# Patient Record
Sex: MALE | Race: WHITE | URBAN - METROPOLITAN AREA
[De-identification: names, ages, dates, MRNs, and addresses within clinical notes are randomized per-mention and may not be internally consistent; named-entity substitution may affect disease eponyms.]

---

## 2017-01-01 ENCOUNTER — TELEPHONE (OUTPATIENT)
Dept: INTERVENTIONAL RADIOLOGY/VASCULAR | Facility: CLINIC | Age: 72
End: 2017-01-01

## 2017-01-01 ENCOUNTER — HOSPITAL ENCOUNTER (OUTPATIENT)
Facility: CLINIC | Age: 72
Discharge: HOME | End: 2017-05-08
Attending: RADIOLOGY | Admitting: RADIOLOGY
Payer: COMMERCIAL

## 2017-01-01 ENCOUNTER — TRANSFERRED RECORDS (OUTPATIENT)
Dept: HEALTH INFORMATION MANAGEMENT | Facility: CLINIC | Age: 72
End: 2017-01-01

## 2017-01-01 ENCOUNTER — MEDICAL CORRESPONDENCE (OUTPATIENT)
Dept: HEALTH INFORMATION MANAGEMENT | Facility: CLINIC | Age: 72
End: 2017-01-01

## 2017-01-01 ENCOUNTER — APPOINTMENT (OUTPATIENT)
Dept: MEDSURG UNIT | Facility: CLINIC | Age: 72
End: 2017-01-01
Attending: RADIOLOGY
Payer: COMMERCIAL

## 2017-01-01 ENCOUNTER — APPOINTMENT (OUTPATIENT)
Dept: INTERVENTIONAL RADIOLOGY/VASCULAR | Facility: CLINIC | Age: 72
End: 2017-01-01
Attending: RADIOLOGY
Payer: COMMERCIAL

## 2017-01-01 ENCOUNTER — APPOINTMENT (OUTPATIENT)
Dept: NUCLEAR MEDICINE | Facility: CLINIC | Age: 72
End: 2017-01-01
Attending: RADIOLOGY
Payer: COMMERCIAL

## 2017-01-01 ENCOUNTER — OFFICE VISIT (OUTPATIENT)
Dept: RADIOLOGY | Facility: CLINIC | Age: 72
End: 2017-01-01
Attending: RADIOLOGY
Payer: COMMERCIAL

## 2017-01-01 ENCOUNTER — HOSPITAL ENCOUNTER (OUTPATIENT)
Dept: NUCLEAR MEDICINE | Facility: CLINIC | Age: 72
Setting detail: NUCLEAR MEDICINE
End: 2017-05-08
Attending: RADIOLOGY | Admitting: RADIOLOGY
Payer: COMMERCIAL

## 2017-01-01 VITALS
BODY MASS INDEX: 24.51 KG/M2 | DIASTOLIC BLOOD PRESSURE: 68 MMHG | TEMPERATURE: 97.5 F | OXYGEN SATURATION: 100 % | SYSTOLIC BLOOD PRESSURE: 119 MMHG | WEIGHT: 184.97 LBS | RESPIRATION RATE: 16 BRPM | HEART RATE: 65 BPM | HEIGHT: 73 IN

## 2017-01-01 VITALS
RESPIRATION RATE: 18 BRPM | BODY MASS INDEX: 24.78 KG/M2 | WEIGHT: 187 LBS | SYSTOLIC BLOOD PRESSURE: 115 MMHG | TEMPERATURE: 97 F | HEART RATE: 67 BPM | DIASTOLIC BLOOD PRESSURE: 63 MMHG

## 2017-01-01 DIAGNOSIS — C22.0 HCC (HEPATOCELLULAR CARCINOMA) (H): Primary | ICD-10-CM

## 2017-01-01 DIAGNOSIS — C22.0 HCC (HEPATOCELLULAR CARCINOMA) (H): ICD-10-CM

## 2017-01-01 LAB
ALBUMIN SERPL-MCNC: 2.4 G/DL (ref 3.4–5)
ALP SERPL-CCNC: 84 U/L (ref 40–150)
ALT SERPL W P-5'-P-CCNC: 30 U/L (ref 0–70)
ANION GAP SERPL CALCULATED.3IONS-SCNC: 9 MMOL/L (ref 3–14)
APTT PPP: 35 SEC (ref 22–37)
AST SERPL W P-5'-P-CCNC: 53 U/L (ref 0–45)
BILIRUB DIRECT SERPL-MCNC: 0.8 MG/DL (ref 0–0.2)
BILIRUB SERPL-MCNC: 2 MG/DL (ref 0.2–1.3)
BUN SERPL-MCNC: 9 MG/DL (ref 7–30)
CALCIUM SERPL-MCNC: 8.7 MG/DL (ref 8.5–10.1)
CHLORIDE SERPL-SCNC: 107 MMOL/L (ref 94–109)
CO2 SERPL-SCNC: 23 MMOL/L (ref 20–32)
CREAT SERPL-MCNC: 0.85 MG/DL (ref 0.66–1.25)
ERYTHROCYTE [DISTWIDTH] IN BLOOD BY AUTOMATED COUNT: 15.7 % (ref 10–15)
GFR SERPL CREATININE-BSD FRML MDRD: 89 ML/MIN/1.7M2
GLUCOSE BLDC GLUCOMTR-MCNC: 312 MG/DL (ref 70–99)
GLUCOSE SERPL-MCNC: 152 MG/DL (ref 70–99)
HCT VFR BLD AUTO: 31.8 % (ref 40–53)
HGB BLD-MCNC: 10.6 G/DL (ref 13.3–17.7)
INR PPP: 1.26 (ref 0.86–1.14)
MCH RBC QN AUTO: 30.8 PG (ref 26.5–33)
MCHC RBC AUTO-ENTMCNC: 33.3 G/DL (ref 31.5–36.5)
MCV RBC AUTO: 92 FL (ref 78–100)
PLATELET # BLD AUTO: 68 10E9/L (ref 150–450)
POTASSIUM SERPL-SCNC: 3.5 MMOL/L (ref 3.4–5.3)
PROT SERPL-MCNC: 6.2 G/DL (ref 6.8–8.8)
RBC # BLD AUTO: 3.44 10E12/L (ref 4.4–5.9)
SODIUM SERPL-SCNC: 139 MMOL/L (ref 133–144)
WBC # BLD AUTO: 2.6 10E9/L (ref 4–11)

## 2017-01-01 PROCEDURE — 99153 MOD SED SAME PHYS/QHP EA: CPT

## 2017-01-01 PROCEDURE — 36247 INS CATH ABD/L-EXT ART 3RD: CPT

## 2017-01-01 PROCEDURE — 77300 RADIATION THERAPY DOSE PLAN: CPT

## 2017-01-01 PROCEDURE — 82962 GLUCOSE BLOOD TEST: CPT

## 2017-01-01 PROCEDURE — 40000168 ZZH STATISTIC PP CARE STAGE 3

## 2017-01-01 PROCEDURE — 75726 ARTERY X-RAYS ABDOMEN: CPT

## 2017-01-01 PROCEDURE — 99152 MOD SED SAME PHYS/QHP 5/>YRS: CPT | Mod: XU

## 2017-01-01 PROCEDURE — 27810301 ZZH RX 278: Performed by: RADIOLOGY

## 2017-01-01 PROCEDURE — 37243 VASC EMBOLIZE/OCCLUDE ORGAN: CPT

## 2017-01-01 PROCEDURE — 99152 MOD SED SAME PHYS/QHP 5/>YRS: CPT

## 2017-01-01 PROCEDURE — C1760 CLOSURE DEV, VASC: HCPCS

## 2017-01-01 PROCEDURE — C2616 BRACHYTX, NON-STR,YTTRIUM-90: HCPCS | Performed by: RADIOLOGY

## 2017-01-01 PROCEDURE — 99212 OFFICE O/P EST SF 10 MIN: CPT

## 2017-01-01 PROCEDURE — 78999 UNLISTED MISC PX DX NUC MED: CPT

## 2017-01-01 RX ORDER — OXYCODONE HYDROCHLORIDE 5 MG/1
5-10 TABLET ORAL EVERY 6 HOURS PRN
Qty: 10 TABLET | Refills: 0 | Status: SHIPPED | OUTPATIENT
Start: 2017-01-01

## 2017-01-01 RX ORDER — ONDANSETRON 2 MG/ML
4 INJECTION INTRAMUSCULAR; INTRAVENOUS ONCE
Status: COMPLETED | OUTPATIENT
Start: 2017-01-01 | End: 2017-01-01

## 2017-01-01 RX ORDER — LIDOCAINE 40 MG/G
CREAM TOPICAL
Status: DISCONTINUED | OUTPATIENT
Start: 2017-01-01 | End: 2017-01-01 | Stop reason: HOSPADM

## 2017-01-01 RX ORDER — FLUMAZENIL 0.1 MG/ML
0.2 INJECTION, SOLUTION INTRAVENOUS
Status: DISCONTINUED | OUTPATIENT
Start: 2017-01-01 | End: 2017-01-01 | Stop reason: HOSPADM

## 2017-01-01 RX ORDER — NALOXONE HYDROCHLORIDE 0.4 MG/ML
.1-.4 INJECTION, SOLUTION INTRAMUSCULAR; INTRAVENOUS; SUBCUTANEOUS
Status: DISCONTINUED | OUTPATIENT
Start: 2017-01-01 | End: 2017-01-01 | Stop reason: HOSPADM

## 2017-01-01 RX ORDER — METHYLPREDNISOLONE 4 MG
8 TABLET, DOSE PACK ORAL SEE ADMIN INSTRUCTIONS
Qty: 1 TABLET | Refills: 0 | Status: SHIPPED | OUTPATIENT
Start: 2017-01-01

## 2017-01-01 RX ORDER — PANTOPRAZOLE SODIUM 40 MG/1
40 TABLET, DELAYED RELEASE ORAL DAILY
Qty: 30 TABLET | Refills: 0 | Status: SHIPPED | OUTPATIENT
Start: 2017-01-01

## 2017-01-01 RX ORDER — IODIXANOL 320 MG/ML
150 INJECTION, SOLUTION INTRAVASCULAR ONCE
Status: COMPLETED | OUTPATIENT
Start: 2017-01-01 | End: 2017-01-01

## 2017-01-01 RX ORDER — FENTANYL CITRATE 50 UG/ML
25-50 INJECTION, SOLUTION INTRAMUSCULAR; INTRAVENOUS EVERY 5 MIN PRN
Status: DISCONTINUED | OUTPATIENT
Start: 2017-01-01 | End: 2017-01-01 | Stop reason: HOSPADM

## 2017-01-01 RX ORDER — HYDROMORPHONE HYDROCHLORIDE 2 MG/1
2-4 TABLET ORAL EVERY 4 HOURS PRN
Status: DISCONTINUED | OUTPATIENT
Start: 2017-01-01 | End: 2017-01-01 | Stop reason: HOSPADM

## 2017-01-01 RX ORDER — AMPICILLIN AND SULBACTAM 2; 1 G/1; G/1
3 INJECTION, POWDER, FOR SOLUTION INTRAMUSCULAR; INTRAVENOUS
Status: COMPLETED | OUTPATIENT
Start: 2017-01-01 | End: 2017-01-01

## 2017-01-01 RX ORDER — SULFAMETHOXAZOLE/TRIMETHOPRIM 800-160 MG
1 TABLET ORAL 2 TIMES DAILY
Status: ON HOLD | COMMUNITY
End: 2017-01-01

## 2017-01-01 RX ORDER — ACETAMINOPHEN 325 MG/1
650 TABLET ORAL EVERY 4 HOURS PRN
Status: DISCONTINUED | OUTPATIENT
Start: 2017-01-01 | End: 2017-01-01 | Stop reason: HOSPADM

## 2017-01-01 RX ORDER — SODIUM CHLORIDE 9 MG/ML
INJECTION, SOLUTION INTRAVENOUS CONTINUOUS
Status: DISCONTINUED | OUTPATIENT
Start: 2017-01-01 | End: 2017-01-01 | Stop reason: HOSPADM

## 2017-01-01 RX ADMIN — MIDAZOLAM HYDROCHLORIDE 2 MG: 1 INJECTION, SOLUTION INTRAMUSCULAR; INTRAVENOUS at 13:25

## 2017-01-01 RX ADMIN — ONDANSETRON 4 MG: 2 INJECTION INTRAMUSCULAR; INTRAVENOUS at 13:33

## 2017-01-01 RX ADMIN — HYDROCORTISONE SODIUM SUCCINATE 100 MG: 100 INJECTION, POWDER, FOR SOLUTION INTRAMUSCULAR; INTRAVENOUS at 11:08

## 2017-01-01 RX ADMIN — FENTANYL CITRATE 100 MCG: 50 INJECTION, SOLUTION INTRAMUSCULAR; INTRAVENOUS at 13:25

## 2017-01-01 RX ADMIN — Medication 32.99 MCI.: at 15:00

## 2017-01-01 RX ADMIN — Medication 32.02 MCI.: at 14:45

## 2017-01-01 RX ADMIN — SODIUM CHLORIDE: 9 INJECTION, SOLUTION INTRAVENOUS at 11:07

## 2017-01-01 RX ADMIN — AMPICILLIN SODIUM AND SULBACTAM SODIUM 3 G: 2; 1 INJECTION, POWDER, FOR SOLUTION INTRAMUSCULAR; INTRAVENOUS at 11:09

## 2017-01-01 RX ADMIN — IODIXANOL 20 ML: 320 INJECTION, SOLUTION INTRAVASCULAR at 14:10

## 2017-01-01 RX ADMIN — PANTOPRAZOLE SODIUM 40 MG: 40 INJECTION, POWDER, FOR SOLUTION INTRAVENOUS at 11:08

## 2017-01-01 ASSESSMENT — PAIN SCALES - GENERAL: PAINLEVEL: NO PAIN (0)

## 2017-04-06 NOTE — TELEPHONE ENCOUNTER
I called and left voicemail including call back number with both patient and his daughter's cell phone. I let them know I have received all [facility] and will call Monday after speaking with Dr. Roblero regarding either making clinic consultation or procedure appointment.  MARY Arana RN, BSN  Interventional Radiology Care Coordinator   Phone:  [phone]

## 2017-04-11 NOTE — TELEPHONE ENCOUNTER
I called and spoke with patients daughter Anat, Per Dr. Perez request, pt is scheduled for clinic 4/19/17 at 1040, per Anat's request.  MARY Arana RN, BSN  Interventional Radiology Care Coordinator   Phone:  [phone]

## 2017-04-19 NOTE — LETTER
4/19/2017       RE: Ra Hoffman  [address] [facility] [address]     Dear Colleague,    Thank you for referring your patient, Ra Hoffman, to the [facility]. Please see a copy of my visit note below.        INTERVENTIONAL RADIOLOGY CONSULTATION    Name: Ra Hoffman  Age: 71 year old   Referring Physician: Dr. Roblero   REASON FOR REFERRAL: multifocal HCC    HPI: Mr. Hoffman is a 70 yo male with history of alcohol induced cirrhosis, who was diagnosed with multifocal right lobe hepatocellular carcinoma. He underwent Y90 to his right lobe one year agio with reasonable disease control. Unfortunately, he has now progressing multifocal HCC in his right lobe.      He is accompanied by his daughter and wife, who assists with much of the medical history. His cirrhosis first came to knowledge several years ago, at which time he was hospitalized with an episode of jaundice, abnormal transaminases, as well as hepatic encephalopathy and ascites. His most recent episode of hepatic decompensation was 3 years ago, at which time he was admitted with encephalopathy, jaundice and ascites, which did require paracentesis. His encephalopathy is medically controlled with lactulose and rifaximin. Ascites is medically controlled with diuretics, and he has not required paracentesis over the last several months. No GI bleeding episodes. No jaundice recently.     Regarding his activity level, he is quite active. He on average is awake for approximately 12 hours per day, does take short naps once per day. He is able to walk in his neighborhood and help with some household chores. 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 He also likes to go to his local [employer] for a soda.       He is a previous smoker, but does is not diagnosed with COPD and has never had to use an inhaler. No dyspnea.    PAST MEDICAL HISTORY:   Past Medical History:   Diagnosis Date     Alcoholic cirrhosis of liver (H)      Cancer (H)     liver  cancer     Diabetes (H)      Hypertension        PAST SURGICAL HISTORY:   Past Surgical History:   Procedure Laterality Date     HERNIA REPAIR         FAMILY HISTORY:   No family history on file.    SOCIAL HISTORY:   Social History   Substance Use Topics     Smoking status: Former Smoker     Quit date: 1/1/2002     Smokeless tobacco: Never Used     Alcohol use No      Comment: Quit drinking 2012       PROBLEM LIST:   There are no active problems to display for this patient.      MEDICATIONS:   Prescription Medications as of 4/19/2017             sulfamethoxazole-trimethoprim (BACTRIM DS/SEPTRA DS) 800-160 MG per tablet Take 1 tablet by mouth 2 times daily    pantoprazole (PROTONIX) 40 MG enteric coated tablet Take 1 tablet (40 mg) by mouth daily    methylPREDNISolone (MEDROL DOSEPAK) 4 MG tablet Take 2 tablets (8 mg) by mouth See Admin Instructions Take as directed on package.    oxyCODONE (ROXICODONE) 5 MG immediate release tablet Take 1-2 tablets (5-10 mg) by mouth every 6 hours as needed for moderate to severe pain    ondansetron (ZOFRAN) 4 MG tablet Take 1-2 tablets (4-8 mg) by mouth every 6 hours as needed for nausea (vomiting)    methylPREDNISolone (MEDROL DOSEPAK) 4 MG tablet Take 1 tablet (4 mg) by mouth See Admin Instructions Take as directed on package.    oxyCODONE (ROXICODONE) 5 MG immediate release tablet Take 1-2 tablets (5-10 mg) by mouth every 6 hours as needed for moderate to severe pain    cholecalciferol 1000 UNITS TABS Take 1,000 Units by mouth daily     furosemide (LASIX) 40 MG tablet Take 40 mg by mouth daily     glipiZIDE (GLUCOTROL) 2.5 MG TABS Take 2.5 mg by mouth daily 1/2 tab Mon, Wed, Fri.    lactulose (CHRONULAC) 10 GM/15ML solution Take 10 g by mouth daily     metFORMIN (GLUCOPHAGE) 1000 MG tablet Take 1,000 mg by mouth 2 times daily (with meals)     Multiple Vitamins-Minerals (MULTIVITAMIN ADULT) TABS Take 1 tablet by mouth daily     propranolol (INDERAL) 20 MG tablet Take 20 mg by  mouth daily     spironolactone (ALDACTONE) 50 MG tablet Take 50 mg by mouth daily     thiamine 100 MG tablet Take 100 mg by mouth daily           ALLERGIES:   Review of patient's allergies indicates no known allergies.    ROS:  Skin: negative  Eyes: negative  Ears/Nose/Throat: negative  Respiratory: No shortness of breath, dyspnea   Cardiovascular: negative  Gastrointestinal: as above  Psychiatric: negative  Endocrine: diabetes      Physical Examination:   VITALS:   /63 (BP Location: Right arm, Patient Position: Chair, Cuff Size: Adult Regular)  Pulse 67  Temp 97  F (36.1  C) (Oral)  Resp 18  Wt 84.8 kg (187 lb)  BMI 24.78 kg/m2  Constitutional: alert, no distress and cooperative  Head: Normocephalic.   ENT: Mallampati 1  Cardiovascular: RRR. No murmur  Respiratory: Good diaphragmatic excursion. Lungs sounds distant but clear  Gastrointestinal: Abdomen soft, non-tender.   Skin: No janudice  Neurologic: No asterixis  Psychiatric: affect normal/bright and mentation appears normal.  Extremities: Symmetric, mild pedal edema    Labs:    BMP RESULTS:  Lab Results   Component Value Date     06/09/2016    POTASSIUM 3.9 06/09/2016    CHLORIDE 108 06/09/2016    CO2 24 06/09/2016    ANIONGAP 7 06/09/2016     (H) 06/09/2016    BUN 12 06/09/2016    CR 0.73 06/09/2016    GFRESTIMATED >90  Non  GFR Calc   06/09/2016    GFRESTBLACK >90   GFR Calc   06/09/2016    MARILU 8.3 (L) 06/09/2016        CBC RESULTS:  Lab Results   Component Value Date    WBC 2.4 (L) 06/09/2016    RBC 3.54 (L) 06/09/2016    HGB 11.2 (L) 06/09/2016    HCT 33.8 (L) 06/09/2016    MCV 96 06/09/2016    MCH 31.6 06/09/2016    MCHC 33.1 06/09/2016    RDW 14.8 06/09/2016    PLT 62 (L) 06/09/2016       INR/PTT:  Lab Results   Component Value Date    INR 1.29 (H) 06/09/2016    PTT 32 03/21/2016       Diagnostic studies:   MRI from [facility] on 2/27/2017:  Progressing multifocal HCC/OPTN 5 lesions throughout the right  lobe, multiple new lesions.    Assessment:  72 yo M with alcohol induced cirrhosis (Child Goodrich, MELD)  progressing multifocal HCC right lobe of liver. Although he received prior right lobe Y90, he is a candidate for right lobar Y90 given current liver function. We discussed Y90 as a means to control his disease, but it is not a cure. We discussed the risks including arterial injury/occlusion, nontarget embolization, acute liver failure. He wishes to proceed.    Plan   Proceed with Y90 mapping and delivery of mapping results are appropriate.    It was a pleasure to see Mr. Hoffman and his family in clinic today. I spent a total of 40 minutes with this patient, over 50 % was for counseling and care coordination.      CC  Patient Care Team:  Bairon Garcia as PCP - General (Family Practice)  Gogo Gonzales as Referring Physician  Laila Roblero MD as MD (Radiology)  LAILA ROBLERO      Again, thank you for allowing me to participate in the care of your patient.      Sincerely,    Laila Roblero MD

## 2017-04-19 NOTE — MR AVS SNAPSHOT
After Visit Summary   4/19/2017    Ra Hoffman    MRN: [id number]           Patient Information     Date Of Birth          1945        Visit Information        Provider Department      4/19/2017 10:40 AM Laila Roblero MD [facility]        Today's Diagnoses     HCC (hepatocellular carcinoma) (H)    -  1       Follow-ups after your visit        Your next 10 appointments already scheduled     May 08, 2017 10:30 AM CDT   Procedure 6.5 hour with U2A ROOM 11   Unit [address] [facility] ([facility])    [address] [facility] [address]               May 08, 2017 12:30 PM CDT   IR PERIPHERAL VISCERAL EMBOLIZATION with UUIR3   [facility], [place], Interventional Radiology ([facility])    [address] [facility] [id number] [phone]           1. You will need to have had a history and physical exam within 7 days of the procedure. 2. Laboratory test are to be obtained by your doctor prior to the exam (CBCP, INR and PTT) 3. Someone will need to drive you to and from the hospital. 4. If you are or may be pregnant, contact your doctor or a Radiology nurse prior to the day of the exam. 5. If you have diabetes, check with your doctor or a Radiology nurse to see if your insulin needs to be adjusted for the exam. 6. If you are taking Coumadin (to thin you blood) please contact your doctor or a Radiology nurse at least 3 days before the exam for special instructions. 7. The day before your exam you may eat your regular diet and are encouraged to drink at least 2 quarts of clear liquids. Drink no alcoholic beverages for 24 hours prior to the exam. 8. Do not eat any solid food or milk products for 6 hours prior to the exam. You may drink clear liquids until 2 hours prior to the exam. Clear liquids include the following: water, Jell-O, clear broth, apple juice or any  noncarbonated drink that you can see through (no pop!) 9. The morning of the exam you may brush your teeth and take medications as directed with a sip of water. 10. Tell the Radiology nurse if you have any allergies.            May 08, 2017  3:00 PM CDT   NM THERASPHERE THERAPY with UUNM3   [facility], [place], Nuclear Medicine ([facility] [place])    [address] [facility] [address] [phone]           Please bring a list of your medicines to the exam. (Include vitamins, minerals and over-the-counter drugs.) You should wear comfortable clothes. Leave your valuables at home. Please bring related prior results and films.  Tell your doctor:   If you are breastfeeding or may be pregnant.   If you have had a barium test within the past few days. Barium may change the results of certain exams.   If you think you may need sedation (medicine to help you relax).  You may eat and drink as normal.  Please call your Imaging Department at your exam site with any questions.              Who to contact     If you have questions or need follow up information about today's clinic visit or your schedule please contact [facility] directly at [phone].  Normal or non-critical lab and imaging results will be communicated to you by MyChart, letter or phone within 4 business days after the clinic has received the results. If you do not hear from us within 7 days, please contact the clinic through [employer] or phone. If you have a critical or abnormal lab result, we will notify you by phone as soon as possible.  Submit refill requests through [employer] or call your pharmacy and they will forward the refill request to us. Please allow 3 business days for your refill to be completed.          Additional Information About Your Visit        [employer] Information     [employer] lets you send messages to your doctor, view your test results, renew your prescriptions, schedule  "appointments and more. To sign up, go to [email or web address] . Click on \"Log in\" on the left side of the screen, which will take you to the Welcome page. Then click on \"Sign up Now\" on the right side of the page.     You will be asked to enter the access code listed below, as well as some personal information. Please follow the directions to create your username and password.     Your access code is: [email or web address]  Expires: 2017  6:30 AM     Your access code will  in 90 days. If you need help or a new code, please call your [facility] clinic or [phone].        Care EveryWhere ID     This is your Care EveryWhere ID. This could be used by other organizations to access your [facility] medical records  [id number]        Your Vitals Were     Pulse Temperature Respirations BMI (Body Mass Index)          67 97  F (36.1  C) (Oral) 18 24.78 kg/m2         Blood Pressure from Last 3 Encounters:   17 125/64   17 115/63   16 125/68    Weight from Last 3 Encounters:   17 83.9 kg (185 lb)   17 84.8 kg (187 lb)   16 95.3 kg (210 lb)              We Performed the Following     NM Y-90 SPECT Mapping        Primary Care Provider Office Phone # Fax #    Bairon Garcia [phone]       XX INFO NOT FOUND XX  [place] [address]        Thank you!     Thank you for choosing [facility]  for your care. Our goal is always to provide you with excellent care. Hearing back from our patients is one way we can continue to improve our services. Please take a few minutes to complete the written survey that you may receive in the mail after your visit with us. Thank you!             Your Updated Medication List - Protect others around you: Learn how to safely use, store and throw away your medicines at www.disposemymeds.org.          This list is accurate as of: 17 11:59 PM.  Always use your most recent med list.                   Brand Name Dispense " Instructions for use    cholecalciferol 1000 UNITS Tabs      Take 1,000 Units by mouth daily       furosemide 40 MG tablet    LASIX     Take 40 mg by mouth daily       glipiZIDE 2.5 mg Tabs half-tab    GLUCOTROL     Take 2.5 mg by mouth daily 1/2 tab Mon, Wed, Fri.       lactulose 10 GM/15ML solution    CHRONULAC     Take 10 g by mouth daily       metFORMIN 1000 MG tablet    GLUCOPHAGE     Take 1,000 mg by mouth 2 times daily (with meals)       * methylPREDNISolone 4 MG tablet    MEDROL DOSEPAK    21 tablet    Take 1 tablet (4 mg) by mouth See Admin Instructions Take as directed on package.       * methylPREDNISolone 4 MG tablet    MEDROL DOSEPAK    1 tablet    Take 2 tablets (8 mg) by mouth See Admin Instructions Take as directed on package.       MULTIVITAMIN ADULT Tabs      Take 1 tablet by mouth daily       ondansetron 4 MG tablet    ZOFRAN    40 tablet    Take 1-2 tablets (4-8 mg) by mouth every 6 hours as needed for nausea (vomiting)       oxyCODONE 5 MG IR tablet    ROXICODONE    40 tablet    Take 1-2 tablets (5-10 mg) by mouth every 6 hours as needed for moderate to severe pain       pantoprazole 40 MG EC tablet    PROTONIX    30 tablet    Take 1 tablet (40 mg) by mouth daily       propranolol 20 MG tablet    INDERAL     Take 20 mg by mouth daily       spironolactone 50 MG tablet    ALDACTONE     Take 50 mg by mouth daily       sulfamethoxazole-trimethoprim 800-160 MG per tablet    BACTRIM DS/SEPTRA DS     Take 1 tablet by mouth 2 times daily       thiamine 100 MG tablet      Take 100 mg by mouth daily       * Notice:  This list has 2 medication(s) that are the same as other medications prescribed for you. Read the directions carefully, and ask your doctor or other care provider to review them with you.

## 2017-04-19 NOTE — NURSING NOTE
"Ra Hoffman is a 71 year old male who presents for:  Chief Complaint   Patient presents with     Oncology Clinic Visit     Return for HCC        Initial Vitals:  /63 (BP Location: Right arm, Patient Position: Chair, Cuff Size: Adult Regular)  Pulse 67  Temp 97  F (36.1  C) (Oral)  Resp 18  Wt 84.8 kg (187 lb)  BMI 24.78 kg/m2 Estimated body mass index is 24.78 kg/(m^2) as calculated from the following:    Height as of 6/9/16: 1.85 m (6' 0.83\").    Weight as of this encounter: 84.8 kg (187 lb).. Body surface area is 2.09 meters squared. BP completed using cuff size: regular  No Pain (0) No LMP for male patient. Allergies and medications reviewed.     Medications: Medication refills not needed today.  [facility] into [facility] [place] [place] [facility] [address]    Comments:     6  minutes for nursing intake (face to face time)   Mary Kate Samuels MA        "

## 2017-04-19 NOTE — PROGRESS NOTES
INTERVENTIONAL RADIOLOGY CONSULTATION    Name: Ra Hoffman  Age: 71 year old   Referring Physician: Dr. Roblero   REASON FOR REFERRAL: multifocal HCC    HPI: Mr. Hoffman is a 70 yo male with history of alcohol induced cirrhosis, who was diagnosed with multifocal right lobe hepatocellular carcinoma. He underwent Y90 to his right lobe one year agio with reasonable disease control. Unfortunately, he has now progressing multifocal HCC in his right lobe.      He is accompanied by his daughter and wife, who assists with much of the medical history. His cirrhosis first came to knowledge several years ago, at which time he was hospitalized with an episode of jaundice, abnormal transaminases, as well as hepatic encephalopathy and ascites. His most recent episode of hepatic decompensation was 3 years ago, at which time he was admitted with encephalopathy, jaundice and ascites, which did require paracentesis. His encephalopathy is medically controlled with lactulose and rifaximin. Ascites is medically controlled with diuretics, and he has not required paracentesis over the last several months. No GI bleeding episodes. No jaundice recently.     Regarding his activity level, he is quite active. He on average is awake for approximately 12 hours per day, does take short naps once per day. He is able to walk in his neighborhood and help with some household chores. 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 He also likes to go to his local [employer] for a soda.       He is a previous smoker, but does is not diagnosed with COPD and has never had to use an inhaler. No dyspnea.    PAST MEDICAL HISTORY:   Past Medical History:   Diagnosis Date     Alcoholic cirrhosis of liver (H)      Cancer (H)     liver cancer     Diabetes (H)      Hypertension        PAST SURGICAL HISTORY:   Past Surgical History:   Procedure Laterality Date     HERNIA REPAIR         FAMILY HISTORY:   No family history on file.    SOCIAL HISTORY:   Social History    Substance Use Topics     Smoking status: Former Smoker     Quit date: 1/1/2002     Smokeless tobacco: Never Used     Alcohol use No      Comment: Quit drinking 2012       PROBLEM LIST:   There are no active problems to display for this patient.      MEDICATIONS:   Prescription Medications as of 4/19/2017             sulfamethoxazole-trimethoprim (BACTRIM DS/SEPTRA DS) 800-160 MG per tablet Take 1 tablet by mouth 2 times daily    pantoprazole (PROTONIX) 40 MG enteric coated tablet Take 1 tablet (40 mg) by mouth daily    methylPREDNISolone (MEDROL DOSEPAK) 4 MG tablet Take 2 tablets (8 mg) by mouth See Admin Instructions Take as directed on package.    oxyCODONE (ROXICODONE) 5 MG immediate release tablet Take 1-2 tablets (5-10 mg) by mouth every 6 hours as needed for moderate to severe pain    ondansetron (ZOFRAN) 4 MG tablet Take 1-2 tablets (4-8 mg) by mouth every 6 hours as needed for nausea (vomiting)    methylPREDNISolone (MEDROL DOSEPAK) 4 MG tablet Take 1 tablet (4 mg) by mouth See Admin Instructions Take as directed on package.    oxyCODONE (ROXICODONE) 5 MG immediate release tablet Take 1-2 tablets (5-10 mg) by mouth every 6 hours as needed for moderate to severe pain    cholecalciferol 1000 UNITS TABS Take 1,000 Units by mouth daily     furosemide (LASIX) 40 MG tablet Take 40 mg by mouth daily     glipiZIDE (GLUCOTROL) 2.5 MG TABS Take 2.5 mg by mouth daily 1/2 tab Mon, Wed, Fri.    lactulose (CHRONULAC) 10 GM/15ML solution Take 10 g by mouth daily     metFORMIN (GLUCOPHAGE) 1000 MG tablet Take 1,000 mg by mouth 2 times daily (with meals)     Multiple Vitamins-Minerals (MULTIVITAMIN ADULT) TABS Take 1 tablet by mouth daily     propranolol (INDERAL) 20 MG tablet Take 20 mg by mouth daily     spironolactone (ALDACTONE) 50 MG tablet Take 50 mg by mouth daily     thiamine 100 MG tablet Take 100 mg by mouth daily           ALLERGIES:   Review of patient's allergies indicates no known allergies.    ROS:  Skin:  negative  Eyes: negative  Ears/Nose/Throat: negative  Respiratory: No shortness of breath, dyspnea   Cardiovascular: negative  Gastrointestinal: as above  Psychiatric: negative  Endocrine: diabetes      Physical Examination:   VITALS:   /63 (BP Location: Right arm, Patient Position: Chair, Cuff Size: Adult Regular)  Pulse 67  Temp 97  F (36.1  C) (Oral)  Resp 18  Wt 84.8 kg (187 lb)  BMI 24.78 kg/m2  Constitutional: alert, no distress and cooperative  Head: Normocephalic.   ENT: Mallampati 1  Cardiovascular: RRR. No murmur  Respiratory: Good diaphragmatic excursion. Lungs sounds distant but clear  Gastrointestinal: Abdomen soft, non-tender.   Skin: No janudice  Neurologic: No asterixis  Psychiatric: affect normal/bright and mentation appears normal.  Extremities: Symmetric, mild pedal edema    Labs:    BMP RESULTS:  Lab Results   Component Value Date     06/09/2016    POTASSIUM 3.9 06/09/2016    CHLORIDE 108 06/09/2016    CO2 24 06/09/2016    ANIONGAP 7 06/09/2016     (H) 06/09/2016    BUN 12 06/09/2016    CR 0.73 06/09/2016    GFRESTIMATED >90  Non  GFR Calc   06/09/2016    GFRESTBLACK >90   GFR Calc   06/09/2016    MARILU 8.3 (L) 06/09/2016        CBC RESULTS:  Lab Results   Component Value Date    WBC 2.4 (L) 06/09/2016    RBC 3.54 (L) 06/09/2016    HGB 11.2 (L) 06/09/2016    HCT 33.8 (L) 06/09/2016    MCV 96 06/09/2016    MCH 31.6 06/09/2016    MCHC 33.1 06/09/2016    RDW 14.8 06/09/2016    PLT 62 (L) 06/09/2016       INR/PTT:  Lab Results   Component Value Date    INR 1.29 (H) 06/09/2016    PTT 32 03/21/2016       Diagnostic studies:   MRI from [facility] on 2/27/2017:  Progressing multifocal HCC/OPTN 5 lesions throughout the right lobe, multiple new lesions.    Assessment:  72 yo M with alcohol induced cirrhosis (Child Goodrich, MELD)  progressing multifocal HCC right lobe of liver. Although he received prior right lobe Y90, he is a candidate for right lobar Y90 given  current liver function. We discussed Y90 as a means to control his disease, but it is not a cure. We discussed the risks including arterial injury/occlusion, nontarget embolization, acute liver failure. He wishes to proceed.    Plan   Proceed with Y90 mapping and delivery of mapping results are appropriate.    It was a pleasure to see Mr. Hoffman and his family in clinic today. I spent a total of 40 minutes with this patient, over 50 % was for counseling and care coordination.      CC  Patient Care Team:  Bairon Garcia as PCP - General (Family Practice)  Gogo Gonzales as Referring Physician  Laila Rbolero MD as MD (Radiology)  LAILA ROBLERO

## 2017-04-21 NOTE — TELEPHONE ENCOUNTER
I called and left a message for patients daughter Brenda that I have him scheduled for his y90 treatment, and left my call back information.    You have been scheduled for Radioembolization with Dr. Roblero in interventional radiology.     Procedure Reminders:     1) Y90 Mapping: Tuesday, May 25th at 12 pm, check-in at 10:30 am.     2) Y90 Delivery: Monday, May 8th at 12:30 pm, check-in at 10:30 am.     For your procedures you will check into the Gold Waiting room, located on the main level, at the [facility], located at [address] [place] [address]     ____________________________________________________________________________     Reminders prior to each procedure:     -No solids or milk products 6 hours prior to procedure time.     -Clear liquids are ok up to 2 hours prior to procedure time.     -You may take your morning medications the day of each procedure with the exception of your DIABETES medications, HOLD your Glucophage and Glipizide the morning of the procedure.     -Plan to spend around 8 hours at the hospital each day, from your check-in time.     -You will receive sedation for each procedure so plan to have a ride home.     ________________________________________________________________________________     -Post-procedure follow up: I will call you after the mapping procedure to confirm we are moving forward with the delivery. I will also call you 2-3 days after your delivery procedure to follow-up.       *Please keep in mind that you may experience a Post Embolization Syndrome after your delivery.       This includes:     -high fevers 101-102, which may last for a couple of days. We recommend using over the counter medications such as Tylenol or Ibuprofen.     -Nausea     -Decreased appetite: Try snacking throughout the day and focus on drinking fluids and staying hydrated.     -Fatigue: this is normal and should only last a few days.     *Abnormal symptoms  that require follow-up with IR or your nearest emergency room.   1. Confusion   2. Swelling of the lower extremities beyond your norm   3. Severe abdominal pain that doesn't go away with pain medications.   4. High fevers that do not come down with over the counter medications.     If you have any concerns regarding the above symptoms, please call our hospital at [phone] and ask for the Interventional Radiologist On-call (after hours) or you can contact me (during business hours) 7- 3:30 pm.     Please feel free to call me with any questions or concerns.     Thank you,     Karin Arana RN, BSN   Interventional Radiology Care Coordinator   Office: [phone]

## 2017-04-21 NOTE — LETTER
April 21, 2017    Ra Hoffman  [address]   [address] [facility] [address]    You have been scheduled for Radioembolization with Dr. Roblero in interventional radiology.     Procedure Reminders:     1) Y90 Mapping: Tuesday, May 25th at 12 pm, check-in at 10:30 am.     2) Y90 Delivery: Monday, May 8th at 12:30 pm, check-in at 10:30 am.     For your procedures you will check into the Gold Waiting room, located on the main level, at the [facility], located at [address] [place] [address]     ____________________________________________________________________________     Reminders prior to each procedure:     -No solids or milk products 6 hours prior to procedure time.     -Clear liquids are ok up to 2 hours prior to procedure time.     -You may take your morning medications the day of each procedure with the exception of your DIABETES medications, HOLD your Glucophage and Glipizide the morning of the procedure.     -Plan to spend around 8 hours at the hospital each day, from your check-in time.     -You will receive sedation for each procedure so plan to have a ride home.     ________________________________________________________________________________     -Post-procedure follow up: I will call you after the mapping procedure to confirm we are moving forward with the delivery. I will also call you 2-3 days after your delivery procedure to follow-up.       *Please keep in mind that you may experience a Post Embolization Syndrome after your delivery.       This includes:     -high fevers 101-102, which may last for a couple of days. We recommend using over the counter medications such as Tylenol or Ibuprofen.     -Nausea     -Decreased appetite: Try snacking throughout the day and focus on drinking fluids and staying hydrated.     -Fatigue: this is normal and should only last a few days.     *Abnormal symptoms that require follow-up with IR or your nearest  emergency room.   1. Confusion   2. Swelling of the lower extremities beyond your norm   3. Severe abdominal pain that doesn't go away with pain medications.   4. High fevers that do not come down with over the counter medications.     If you have any concerns regarding the above symptoms, please call our hospital at [phone] and ask for the Interventional Radiologist On-call (after hours) or you can contact me (during business hours) 7- 3:30 pm.     Please feel free to call me with any questions or concerns.     Thank you,     Karin Arana RN, BSN   Interventional Radiology Care Coordinator   Office: [phone]

## 2017-05-02 NOTE — TELEPHONE ENCOUNTER
Patients wife Lisseth phoned, she states that Ra has a lump in his left groin at puncture site, she states it is about the size of a quarter.  She notes some bruising down his leg and at the site about half the size of her hand.  She states he denies pain and has no fever.  We talked about possibility of pseudo aneurysm vs mynx closure device.  I have stressed the importance of watching the site for growth.  I have returned her call with advice but she didn't answer.  I will reattempt.  MARY Arana RN, BSN  Interventional Radiology Care Coordinator   Phone:  [phone]  I called and spoke with patients wife lisseth, she was able to look at the lump last evening 25/2/17, it is walnut sized and hard, she states there is 10 inches of bruising from his groin down his leg.  Dr. Roblero recommends and US to look for pseudoaneurysm.  [facility] in [place], they have my fax number to send the report once completed.  Wife confirms this.  MARY Arana RN, BSN  Interventional Radiology Care Coordinator   Phone:  [phone]

## 2017-05-08 NOTE — PROGRESS NOTES
Discharge instructions given and pt voiced understanding. Scripts picked up from pharmacy. Rt groin site is soft and flat. No hematoma. Positive pulses, color, and warmth bilateral lower extremities. Up walking in room. Ate well for lunch. Urinating adequate amounts. No complaint of discomfort. Adequate for discharge. Discharged to home with family.

## 2017-05-08 NOTE — PROCEDURES
Interventional Radiology Brief Post Procedure Note    Procedure: @[email or web address]([id number])@  Angiogram and deliver of the Y90 Theraspheres into the right hepatic lobe    Proceduralist: Shelby Fuller MD, MD and Laila Roblero MD    Time Out: Prior to the start of the procedure and with procedural staff participation, I verbally confirmed the patient s identity using two indicators, relevant allergies, that the procedure was appropriate and matched the consent or emergent situation, and that the correct equipment/implants were available. Immediately prior to starting the procedure I conducted the Time Out with the procedural staff and re-confirmed the patient s name, procedure, and site/side. (The [facility] universal protocol was followed.)  Yes    Sedation: IR Nurse Monitored Care   Post Procedure Summary:  Prior to the start of the procedure and with procedural staff participation, I verbally confirmed the patient s identity using two indicators, relevant allergies, that the procedure was appropriate and matched the consent or emergent situation, and that the correct equipment/implants were available. Immediately prior to starting the procedure I conducted the Time Out with the procedural staff and re-confirmed the patient s name, procedure, and site/side. (The [employer] Commission universal protocol was followed.)  Yes       Sedatives: Fentanyl and Midazolam (Versed)    Vital signs, airway and pulse oximetry were monitored and remained stable throughout the procedure and sedation was maintained until the procedure was complete.  The patient was monitored by staff until sedation discharge criteria were met.    Patient tolerance: Patient tolerated the procedure well with no immediate complications.    Time of sedation in minutes: 60 Minutes minutes from beginning to end of physician one to one monitoring.        Findings: Right groin puncture. 20GBq of Y90 Theraspheres delivered into the right hepatic lobe.  Mynx arterial closure deice deployed.     Estimated Blood Loss: None    SPECIMENS: None    Complications: 1. None     Condition: Stable    Plan:   Bed rest for 3 hours with right leg straight  Nuc Med scan this PM  RN please get the Perscribed medications from the pharmacist before discharge  Patient can be discharged later, after the Nuc Med scan,  if he meets discharge criteria.       Comments: See dictated procedure note for full details.    Shelby Fuller MD, MD

## 2017-05-08 NOTE — PROGRESS NOTES
Blood glucose 312 mg/dL. Patient had recently eaten. Held oral metformin and glipizide this morning. Will be holding metformin for 48 hours following procedure. Glipizide given.

## 2017-05-08 NOTE — PROGRESS NOTES
Interventional Radiology Pre-Procedure Sedation Assessment   Time of Assessment: 12:26 PM    Expected Level: Moderate Sedation    Indication: Sedation is required for the following type of Procedure: Arterial    Sedation and procedural consent: Risks, benefits and alternatives were discussed with Patient    PO Intake: Appropriately NPO for procedure    ASA Class: Class 3 - SEVERE SYSTEMIC DISEASE, DEFINITE FUNCTIONAL LIMITATIONS.    Mallampati: Grade 2:  Soft palate, base of uvula, tonsillar pillars, and portion of posterior pharyngeal wall visible    Lungs: Lungs Clear with good breath sounds bilaterally    Heart: Normal heart sounds and rate    History and physical reviewed and no updates needed. I have reviewed the lab findings, diagnostic data, medications, and the plan for sedation. I have determined this patient to be an appropriate candidate for the planned sedation and procedure and have reassessed the patient IMMEDIATELY PRIOR to sedation and procedure.    Shelby Fuller MD, MD

## 2017-05-08 NOTE — PLAN OF CARE
Pt arrived via cart from IR. Right groin site CDI, no hematoma. VSS. Pt reminded is on flat bedrest until 1730.

## 2017-05-08 NOTE — IP AVS SNAPSHOT
[address] [address]                                       After Visit Summary   5/8/2017    Ra Hoffman    MRN: [id number]           After Visit Summary Signature Page     I have received my discharge instructions, and my questions have been answered. I have discussed any challenges I see with this plan with the nurse or doctor.    ..........................................................................................................................................  Patient/Patient Representative Signature      ..........................................................................................................................................  Patient Representative Print Name and Relationship to Patient    ..................................................               ................................................  Date                                            Time    ..........................................................................................................................................  Reviewed by Signature/Title    ...................................................              ..............................................  Date                                                            Time

## 2017-05-08 NOTE — DISCHARGE INSTRUCTIONS
Going Home after a Thereasphere Mapping or Delivery      For 24 hours:    Have an adult stay with you for 24 hours.    Drink plenty of fluids.    You may eat your normal diet, unless your doctor tells you otherwise.:  - Relax and take it easy.  - Do NOT smoke.  - Do NOT make any important or legal decisions.  - Do NOT drive or operate machines at home or at work.  - Do NOT drink alcohol.      Remove the Band-Aid after 24 hours. If there is minor oozing, apply     another Band-aid and remove it after 12 hours.    Do NOT take a bath, or use a hot tub or pool for at least 3 days. You may shower.    Care of the Wrist or arm site:    It is normal to have soreness at the puncture site and mild tingling in your hand for up to 3 days. There should not be a lump at the site.    Remove the Band-Aid after 24 hours, If there is minor oozing, apply another Band-Aid and remove it after 12 hours    Do not take a bath, or use a hot tub or pool for the next 24 hours. You may shower but leave the Band-Aid on until after the shower.    Do not Scrub the site.     Do not use lotion or powder near the puncture site for 3 days    For 2 days, do not use you hand or arm to support your weight (such as rising from the chair) or bend you wrist (such as lifting a garage door.    For 2 days, do not lift more than 5 pounds or exercise your arm ( (tennis, golf, or bowling).    If you start bleeding from the site in your arm: Sit down and press firmly on the site with your fingers for 10 minutes. Call your doctor as soon as you can.    Brad 911 if you have bleeding that is heavy or does not stop.    Call your doctor if:     You have a large or growing hard lump around the site.    The site is red, swollen, hot or tender.    Blood or fluid is draining from the site.    You have chills or a fever greater that 101'F (38'C)    Your arm turns bluish, feels numb or cool.    You have hives, a rash or unusual itching  Additional Information: Claire Rincon,  the Interventional Radiology Nurse will call you to set up an appointment for a MRI in 4 weeks and with a follow up appointment.    Procedural Physician: Shelby Fuller MD            Date: 5/8/2017    [phone]: Ask for the Interventional Radiologist on call. Someone is available 24 hours a day

## 2017-05-08 NOTE — IP AVS SNAPSHOT
MRN:[id number]                      After Visit Summary   5/8/2017    Ra Hoffman    MRN: [id number]           Visit Information        Department      5/8/2017  9:16 AM [address] [facility]          Review of your medicines      UNREVIEWED medicines. Ask your doctor about these medicines        Dose / Directions    cholecalciferol 1000 UNITS Tabs        Dose:  1000 Units   Take 1,000 Units by mouth daily   Refills:  0       furosemide 40 MG tablet   Commonly known as:  LASIX        Dose:  40 mg   Take 40 mg by mouth daily   Refills:  0       glipiZIDE 2.5 mg Tabs half-tab   Commonly known as:  GLUCOTROL        Dose:  2.5 mg   Take 2.5 mg by mouth daily 1/2 tab Mon, Wed, Fri.   Refills:  0       lactulose 10 GM/15ML solution   Commonly known as:  CHRONULAC        Dose:  10 g   Take 10 g by mouth daily   Refills:  0       metFORMIN 1000 MG tablet   Commonly known as:  GLUCOPHAGE        Dose:  1000 mg   Take 1,000 mg by mouth 2 times daily (with meals)   Refills:  0       MULTIVITAMIN ADULT Tabs        Dose:  1 tablet   Take 1 tablet by mouth daily   Refills:  0       ondansetron 4 MG tablet   Commonly known as:  ZOFRAN   Used for:  Hepatocellular carcinoma (H)        Dose:  4-8 mg   Take 1-2 tablets (4-8 mg) by mouth every 6 hours as needed for nausea (vomiting)   Quantity:  40 tablet   Refills:  0       propranolol 20 MG tablet   Commonly known as:  INDERAL        Dose:  20 mg   Take 20 mg by mouth daily   Refills:  0       spironolactone 50 MG tablet   Commonly known as:  ALDACTONE        Dose:  50 mg   Take 50 mg by mouth daily   Refills:  0       thiamine 100 MG tablet        Dose:  100 mg   Take 100 mg by mouth daily   Refills:  0         START taking        Dose / Directions    methylPREDNISolone 4 MG tablet   Commonly known as:  MEDROL DOSEPAK   Used for:  HCC (hepatocellular carcinoma) (H)        Dose:  8 mg   Start taking on:  5/9/2017   Take 2 tablets (8 mg) by mouth See Admin  Instructions Take as directed on package.   Quantity:  1 tablet   Refills:  0       oxyCODONE 5 MG IR tablet   Commonly known as:  ROXICODONE   Used for:  HCC (hepatocellular carcinoma) (H)        Dose:  5-10 mg   Take 1-2 tablets (5-10 mg) by mouth every 6 hours as needed for moderate to severe pain   Quantity:  10 tablet   Refills:  0       pantoprazole 40 MG EC tablet   Commonly known as:  PROTONIX   Used for:  HCC (hepatocellular carcinoma) (H)        Dose:  40 mg   Take 1 tablet (40 mg) by mouth daily   Quantity:  30 tablet   Refills:  0            Where to get your medicines      Some of these will need a paper prescription and others can be bought over the counter. Ask your nurse if you have questions.     Bring a paper prescription for each of these medications     methylPREDNISolone 4 MG tablet    oxyCODONE 5 MG IR tablet    pantoprazole 40 MG EC tablet               Prescriptions were sent or printed at these locations (3 Prescriptions)                   Other Prescriptions                Printed at Department/Unit printer (3 of 3)         pantoprazole (PROTONIX) 40 MG EC tablet               methylPREDNISolone (MEDROL DOSEPAK) 4 MG tablet               oxyCODONE (ROXICODONE) 5 MG IR tablet                 Protect others around you: Learn how to safely use, store and throw away your medicines at www.disposemymeds.org.         Follow-ups after your visit        Your next 10 appointments already scheduled     May 08, 2017  3:00 PM CDT   NM THERASPHERE THERAPY with [facility]   [facility], [place], Nuclear Medicine ([facility], [facility])    [address] [address] [phone]           Please bring a list of your medicines to the exam. (Include vitamins, minerals and over-the-counter drugs.) You should wear comfortable clothes. Leave your valuables at home. Please bring related prior results and films.  Tell your doctor:   If you are breastfeeding or may be  pregnant.   If you have had a barium test within the past few days. Barium may change the results of certain exams.   If you think you may need sedation (medicine to help you relax).  You may eat and drink as normal.  Please call your Imaging Department at your exam site with any questions.               Care Instructions        After Care Instructions     Discharge Instructions / education       *Review the medication instructions.  *Review patient education materials with the patient.   *Review Post-Y90 discharge instructions with patient and family.  *Review Contrast Discharge Instruction sheet with patient.  *If patient diabetic, discharge instruction sheet to be given to patient.            Discharge Instructions / education       *Review the medication instructions.  *Review patient education materials with the patient.   *Review Post-Y90 discharge instructions with patient and family.  *Review Contrast Discharge Instruction sheet with patient.  *If patient diabetic, discharge instruction sheet to be given to patient.            May discharge when       Discharge patient  when bed rest is completed IF:  *Vital signs are within the patient's normal limits.  *Patient is able to void and transfer with assistance.  *Patient is alert and tolerates oral fluids.  *Patient must be discharged with a  and have someone to stay to assist them with bathroom privileges at home.            May discharge when       Discharge patient  when bed rest is completed IF:  *Vital signs are within the patient's normal limits.  *Patient is able to void and transfer with assistance.  *Patient is alert and tolerates oral fluids.  *Patient must be discharged with a  and have someone to stay to assist them with bathroom privileges at home.                  Further instructions from your care team       Going Home after a Thereasphere Mapping or Delivery      For 24 hours:    Have an adult stay with you for 24 hours.    Drink  plenty of fluids.    You may eat your normal diet, unless your doctor tells you otherwise.:  - Relax and take it easy.  - Do NOT smoke.  - Do NOT make any important or legal decisions.  - Do NOT drive or operate machines at home or at work.  - Do NOT drink alcohol.      Remove the Band-Aid after 24 hours. If there is minor oozing, apply     another Band-aid and remove it after 12 hours.    Do NOT take a bath, or use a hot tub or pool for at least 3 days. You may shower.    Care of the Wrist or arm site:    It is normal to have soreness at the puncture site and mild tingling in your hand for up to 3 days. There should not be a lump at the site.    Remove the Band-Aid after 24 hours, If there is minor oozing, apply another Band-Aid and remove it after 12 hours    Do not take a bath, or use a hot tub or pool for the next 24 hours. You may shower but leave the Band-Aid on until after the shower.    Do not Scrub the site.     Do not use lotion or powder near the puncture site for 3 days    For 2 days, do not use you hand or arm to support your weight (such as rising from the chair) or bend you wrist (such as lifting a garage door.    For 2 days, do not lift more than 5 pounds or exercise your arm ( (tennis, golf, or bowling).    If you start bleeding from the site in your arm: Sit down and press firmly on the site with your fingers for 10 minutes. Call your doctor as soon as you can.    Brad 911 if you have bleeding that is heavy or does not stop.    Call your doctor if:     You have a large or growing hard lump around the site.    The site is red, swollen, hot or tender.    Blood or fluid is draining from the site.    You have chills or a fever greater that 101'F (38'C)    Your arm turns bluish, feels numb or cool.    You have hives, a rash or unusual itching  Additional Information: Claire Rincon, the Interventional Radiology Nurse will call you to set up an appointment for a MRI in 4 weeks and with a follow up  "appointment.    Procedural Physician: Shelby Fuller MD            Date: 2017    [phone]: Ask for the Interventional Radiologist on call. Someone is available 24 hours a day             Additional Information About Your Visit        [employer] Information     [employer] lets you send messages to your doctor, view your test results, renew your prescriptions, schedule appointments and more. To sign up, go to [email or web address] . Click on \"Log in\" on the left side of the screen, which will take you to the Welcome page. Then click on \"Sign up Now\" on the right side of the page.     You will be asked to enter the access code listed below, as well as some personal information. Please follow the directions to create your username and password.     Your access code is: [email or web address]  Expires: 2017  6:30 AM     Your access code will  in 90 days. If you need help or a new code, please call your [facility] [facility] clinic or [phone].        Care EveryWhere ID     This is your Care EveryWhere ID. This could be used by other organizations to access your [facility] medical records  [id number]        Your Vitals Were     Blood Pressure Pulse Temperature Respirations Height Weight    128/70 (BP Location: Left arm) 66 97.5  F (36.4  C) (Oral) 20 1.854 m (6' 1\") 83.9 kg (184 lb 15.5 oz)    Pulse Oximetry BMI (Body Mass Index)                96% 24.4 kg/m2           Primary Care Provider Office Phone # Fax #    Bairon Garcia [phone]      Thank you!     Thank you for choosing [facility] for your care. Our goal is always to provide you with excellent care. Hearing back from our patients is one way we can continue to improve our services. Please take a few minutes to complete the written survey that you may receive in the mail after you visit with us. Thank you!             Medication List: This is a list of all your medications and when to take them. Check marks below indicate your daily home schedule. " Keep this list as a reference.      Medications           Morning Afternoon Evening Bedtime As Needed    cholecalciferol 1000 UNITS Tabs   Take 1,000 Units by mouth daily                                furosemide 40 MG tablet   Commonly known as:  LASIX   Take 40 mg by mouth daily                                glipiZIDE 2.5 mg Tabs half-tab   Commonly known as:  GLUCOTROL   Take 2.5 mg by mouth daily 1/2 tab Mon, Wed, Fri.                                lactulose 10 GM/15ML solution   Commonly known as:  CHRONULAC   Take 10 g by mouth daily                                metFORMIN 1000 MG tablet   Commonly known as:  GLUCOPHAGE   Take 1,000 mg by mouth 2 times daily (with meals)                                methylPREDNISolone 4 MG tablet   Commonly known as:  MEDROL DOSEPAK   Take 2 tablets (8 mg) by mouth See Admin Instructions Take as directed on package.   Start taking on:  5/9/2017                                MULTIVITAMIN ADULT Tabs   Take 1 tablet by mouth daily                                ondansetron 4 MG tablet   Commonly known as:  ZOFRAN   Take 1-2 tablets (4-8 mg) by mouth every 6 hours as needed for nausea (vomiting)                                oxyCODONE 5 MG IR tablet   Commonly known as:  ROXICODONE   Take 1-2 tablets (5-10 mg) by mouth every 6 hours as needed for moderate to severe pain                                pantoprazole 40 MG EC tablet   Commonly known as:  PROTONIX   Take 1 tablet (40 mg) by mouth daily                                propranolol 20 MG tablet   Commonly known as:  INDERAL   Take 20 mg by mouth daily                                spironolactone 50 MG tablet   Commonly known as:  ALDACTONE   Take 50 mg by mouth daily                                thiamine 100 MG tablet   Take 100 mg by mouth daily

## 2017-05-08 NOTE — PROGRESS NOTES
Interventional Radiology Intra-procedural Nursing Note    Patient Name: Ra Hoffman  Medical Record Number: [id number]  Today's Date: May 8, 2017    Start Time: 1250  End of procedure time: 1410  Procedure: Yttrium 90 Delivery  Staff: Alina BARR, Annika BARR  Report given to: 2A RN    Sedation Time:  Fentanyl: 100 mcg  Versed: 2 mg    Other Notes:   Assumed pt care @ 1320 from SAMANTHA Simon RN..  Pt supine on table, resting comfortably, easy to arouse.  Preparing to deliver dose, zofran administered.      1st dose delivered @ 1335.  2nd dose delivered at 1353.     Pt tolerated procedure with no noted complications.     MYNXGRIP closure device deployed at 1400 to RFA. Hemostasis achieved at 1420.  Per MD 3 hour bedrest post-procedure.  RIGHT LEG STRAIGHT UNTIL 1720.    Transferred back to  for further care.    Emily De León RN assumed care of patient mid-procedure from Merlyn Batres RN at 1345.  Patient status reviewed, patient allergies reviewed at this time.    Brynn Batres RN

## 2017-05-11 NOTE — TELEPHONE ENCOUNTER
I called and spoke with patients wife Lisseth.  She states he is doing well post y90, that there is no leaking drainage from his access site.  He is eating and drinking well and denies pain.  We discussed that his [facility] up at 1 month [facility], and that I would send them the procedure note.  WIfe to call me if she has concerns or questions.  MARY Arana RN, BSN  Interventional Radiology Care Coordinator   Phone:  [phone]

## (undated) RX ORDER — FENTANYL CITRATE 50 UG/ML
INJECTION, SOLUTION INTRAMUSCULAR; INTRAVENOUS
Status: DISPENSED
Start: 2017-01-01

## (undated) RX ORDER — ONDANSETRON 2 MG/ML
INJECTION INTRAMUSCULAR; INTRAVENOUS
Status: DISPENSED
Start: 2017-01-01

## (undated) RX ORDER — SODIUM CHLORIDE 9 MG/ML
INJECTION, SOLUTION INTRAVENOUS
Status: DISPENSED
Start: 2017-01-01

## (undated) RX ORDER — PANTOPRAZOLE SODIUM 40 MG/10ML
INJECTION, POWDER, LYOPHILIZED, FOR SOLUTION INTRAVENOUS
Status: DISPENSED
Start: 2017-01-01

## (undated) RX ORDER — AMPICILLIN AND SULBACTAM 2; 1 G/1; G/1
INJECTION, POWDER, FOR SOLUTION INTRAMUSCULAR; INTRAVENOUS
Status: DISPENSED
Start: 2017-01-01